# Patient Record
Sex: MALE | Race: OTHER | HISPANIC OR LATINO | Employment: UNEMPLOYED | URBAN - METROPOLITAN AREA
[De-identification: names, ages, dates, MRNs, and addresses within clinical notes are randomized per-mention and may not be internally consistent; named-entity substitution may affect disease eponyms.]

---

## 2023-02-16 ENCOUNTER — HOSPITAL ENCOUNTER (EMERGENCY)
Facility: HOSPITAL | Age: 48
Discharge: HOME | End: 2023-02-16
Attending: EMERGENCY MEDICINE

## 2023-02-16 ENCOUNTER — APPOINTMENT (EMERGENCY)
Dept: CT IMAGING | Facility: HOSPITAL | Age: 48
End: 2023-02-16

## 2023-02-16 ENCOUNTER — APPOINTMENT (EMERGENCY)
Dept: RADIOLOGY | Facility: HOSPITAL | Age: 48
End: 2023-02-16

## 2023-02-16 VITALS
RESPIRATION RATE: 16 BRPM | SYSTOLIC BLOOD PRESSURE: 138 MMHG | DIASTOLIC BLOOD PRESSURE: 88 MMHG | HEART RATE: 77 BPM | OXYGEN SATURATION: 96 % | TEMPERATURE: 99.9 F

## 2023-02-16 DIAGNOSIS — R07.81 RIB PAIN ON LEFT SIDE: ICD-10-CM

## 2023-02-16 DIAGNOSIS — S20.212A RIB CONTUSION, LEFT, INITIAL ENCOUNTER: ICD-10-CM

## 2023-02-16 DIAGNOSIS — M25.572 LEFT ANKLE PAIN: ICD-10-CM

## 2023-02-16 DIAGNOSIS — R10.9 LEFT FLANK PAIN: ICD-10-CM
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
## 2023-02-16 DIAGNOSIS — S82.832A CLOSED FRACTURE OF LEFT DISTAL FIBULA: Primary | ICD-10-CM

## 2023-02-16 LAB
ANION GAP SERPL CALCULATED.3IONS-SCNC: 8 MMOL/L (ref 4–13)
BASOPHILS # BLD AUTO: 0.06 THOUSANDS/ÂΜL (ref 0–0.1)
BASOPHILS NFR BLD AUTO: 0 % (ref 0–1)
BUN SERPL-MCNC: 17 MG/DL (ref 5–25)
CALCIUM SERPL-MCNC: 9.2 MG/DL (ref 8.4–10.2)
CHLORIDE SERPL-SCNC: 103 MMOL/L (ref 96–108)
CO2 SERPL-SCNC: 27 MMOL/L (ref 21–32)
CREAT SERPL-MCNC: 1.03 MG/DL (ref 0.6–1.3)
EOSINOPHIL # BLD AUTO: 0.34 THOUSAND/ÂΜL (ref 0–0.61)
EOSINOPHIL NFR BLD AUTO: 3 % (ref 0–6)
ERYTHROCYTE [DISTWIDTH] IN BLOOD BY AUTOMATED COUNT: 12.8 % (ref 11.6–15.1)
GFR SERPL CREATININE-BSD FRML MDRD: 85 ML/MIN/1.73SQ M
GLUCOSE SERPL-MCNC: 108 MG/DL (ref 65–140)
HCT VFR BLD AUTO: 44.8 % (ref 36.5–49.3)
HGB BLD-MCNC: 14.9 G/DL (ref 12–17)
IMM GRANULOCYTES # BLD AUTO: 0.05 THOUSAND/UL (ref 0–0.2)
IMM GRANULOCYTES NFR BLD AUTO: 0 % (ref 0–2)
LYMPHOCYTES # BLD AUTO: 4.06 THOUSANDS/ÂΜL (ref 0.6–4.47)
LYMPHOCYTES NFR BLD AUTO: 30 % (ref 14–44)
MCH RBC QN AUTO: 29 PG (ref 26.8–34.3)
MCHC RBC AUTO-ENTMCNC: 33.3 G/DL (ref 31.4–37.4)
MCV RBC AUTO: 87 FL (ref 82–98)
MONOCYTES # BLD AUTO: 0.91 THOUSAND/ÂΜL (ref 0.17–1.22)
MONOCYTES NFR BLD AUTO: 7 % (ref 4–12)
NEUTROPHILS # BLD AUTO: 8.07 THOUSANDS/ÂΜL (ref 1.85–7.62)
NEUTS SEG NFR BLD AUTO: 60 % (ref 43–75)
NRBC BLD AUTO-RTO: 0 /100 WBCS
PLATELET # BLD AUTO: 299 THOUSANDS/UL (ref 149–390)
PMV BLD AUTO: 8.4 FL (ref 8.9–12.7)
POTASSIUM SERPL-SCNC: 4.2 MMOL/L (ref 3.5–5.3)
RBC # BLD AUTO: 5.13 MILLION/UL (ref 3.88–5.62)
SODIUM SERPL-SCNC: 138 MMOL/L (ref 135–147)
WBC # BLD AUTO: 13.49 THOUSAND/UL (ref 4.31–10.16)

## 2023-02-16 RX ORDER — LIDOCAINE 50 MG/G
1 PATCH TOPICAL ONCE
Status: DISCONTINUED | OUTPATIENT
Start: 2023-02-16 | End: 2023-02-16 | Stop reason: HOSPADM

## 2023-02-16 RX ORDER — OXYCODONE HYDROCHLORIDE AND ACETAMINOPHEN 5; 325 MG/1; MG/1
1 TABLET ORAL EVERY 4 HOURS PRN
Qty: 20 TABLET | Refills: 0 | Status: SHIPPED | OUTPATIENT
Start: 2023-02-16

## 2023-02-16 RX ORDER — FENTANYL CITRATE 50 UG/ML
50 INJECTION, SOLUTION INTRAMUSCULAR; INTRAVENOUS ONCE
Status: COMPLETED | OUTPATIENT
Start: 2023-02-16 | End: 2023-02-16

## 2023-02-16 RX ORDER — METHOCARBAMOL 500 MG/1
500 TABLET, FILM COATED ORAL 2 TIMES DAILY
Qty: 20 TABLET | Refills: 0 | Status: SHIPPED | OUTPATIENT
Start: 2023-02-16

## 2023-02-16 RX ORDER — LIDOCAINE 50 MG/G
1 PATCH TOPICAL DAILY
Qty: 20 PATCH | Refills: 0 | Status: SHIPPED | OUTPATIENT
Start: 2023-02-16

## 2023-02-16 RX ADMIN — LIDOCAINE 5% 1 PATCH: 700 PATCH TOPICAL at 18:49

## 2023-02-16 RX ADMIN — IOHEXOL 100 ML: 350 INJECTION, SOLUTION INTRAVENOUS at 19:48

## 2023-02-16 RX ADMIN — FENTANYL CITRATE 50 MCG: 50 INJECTION, SOLUTION INTRAMUSCULAR; INTRAVENOUS at 18:47

## 2023-02-16 NOTE — ED PROVIDER NOTES
History  Chief Complaint   Patient presents with   • Fall     Pt reports he fell down 7 stairs onto butt, injured left ankle and left side of back/ribs  49 y o  M p/w left ankle pain and left back/rib pain s/p fall down stairs  Denies head injury or LOC  Having pain to left ankle but has been ambulatory as well as left flank/left posterior rib pain  History provided by:  Patient   used: No    Fall  Mechanism of injury: fall    Incident location:  Home  Fall:     Fall occurred:  Down stairs  Associated symptoms: back pain    Associated symptoms: no headaches        None       History reviewed  No pertinent past medical history  History reviewed  No pertinent surgical history  History reviewed  No pertinent family history  I have reviewed and agree with the history as documented  E-Cigarette/Vaping     E-Cigarette/Vaping Substances     Social History     Tobacco Use   • Smoking status: Every Day     Packs/day: 0 25     Types: Cigarettes   Substance Use Topics   • Alcohol use: Never   • Drug use: Never       Review of Systems   Musculoskeletal: Positive for back pain  Left ankle pain   Neurological: Negative for headaches  Physical Exam  Physical Exam  Vitals and nursing note reviewed  Constitutional:       General: He is not in acute distress  Appearance: Normal appearance  He is well-developed  He is not ill-appearing, toxic-appearing or diaphoretic  HENT:      Head: Normocephalic and atraumatic  No raccoon eyes, Win's sign, abrasion, contusion or laceration  Right Ear: Tympanic membrane and external ear normal  No laceration or drainage  No hemotympanum  Left Ear: Tympanic membrane and external ear normal  No laceration or drainage  No hemotympanum  Nose: Nose normal    Eyes:      General:         Right eye: No discharge  Left eye: No discharge  Extraocular Movements: Extraocular movements intact        Conjunctiva/sclera: Right eye: No hemorrhage  Left eye: No hemorrhage  Pupils: Pupils are equal, round, and reactive to light  Neck:      Vascular: No JVD  Trachea: Trachea normal  No tracheal tenderness or tracheal deviation  Cardiovascular:      Rate and Rhythm: Normal rate and regular rhythm  Pulses:           Dorsalis pedis pulses are 2+ on the left side  Heart sounds: Normal heart sounds  No murmur heard  No friction rub  Pulmonary:      Effort: Pulmonary effort is normal  No accessory muscle usage, respiratory distress or retractions  Breath sounds: Normal breath sounds  No stridor  No decreased breath sounds, wheezing, rhonchi or rales  Chest:      Chest wall: No tenderness  Abdominal:      General: There is no distension  Palpations: Abdomen is soft  Abdomen is not rigid  There is no mass  Tenderness: There is no abdominal tenderness  There is no guarding or rebound  Musculoskeletal:         General: No deformity  Normal range of motion  Cervical back: Full passive range of motion without pain and normal range of motion  No bony tenderness  No spinous process tenderness or muscular tenderness  Normal range of motion  Thoracic back: No bony tenderness  Lumbar back: No bony tenderness  Left ankle: Swelling and ecchymosis present  Tenderness present over the lateral malleolus and medial malleolus  Normal pulse  Left foot: Normal range of motion  No swelling, tenderness or crepitus  Normal pulse  Comments: TTP to left posterolateral ribs with bruising   Skin:     General: Skin is warm and dry  Coloration: Skin is not pale  Findings: Bruising (left back/flank, left ankle) present  No abrasion, ecchymosis or laceration  Neurological:      Mental Status: He is alert  GCS: GCS eye subscore is 4  GCS verbal subscore is 5  GCS motor subscore is 6  Cranial Nerves: No cranial nerve deficit  Sensory: No sensory deficit  Motor: Motor function is intact  Psychiatric:         Behavior: Behavior normal  Behavior is cooperative           Vital Signs  ED Triage Vitals   Temperature Pulse Respirations Blood Pressure SpO2   02/16/23 1822 02/16/23 1822 02/16/23 1822 02/16/23 1822 02/16/23 1822   99 9 °F (37 7 °C) 100 18 128/88 96 %      Temp Source Heart Rate Source Patient Position - Orthostatic VS BP Location FiO2 (%)   02/16/23 1822 -- 02/16/23 2120 02/16/23 2120 --   Oral  Sitting Left arm       Pain Score       02/16/23 1847       10 - Worst Possible Pain           Vitals:    02/16/23 1822 02/16/23 2120   BP: 128/88 138/88   Pulse: 100 77   Patient Position - Orthostatic VS:  Sitting         Visual Acuity      ED Medications  Medications   lidocaine (LIDODERM) 5 % patch 1 patch (1 patch Topical Medication Applied 2/16/23 1849)   fentanyl citrate (PF) 100 MCG/2ML 50 mcg (50 mcg Intravenous Given 2/16/23 1847)   iohexol (OMNIPAQUE) 350 MG/ML injection (SINGLE-DOSE) 100 mL (100 mL Intravenous Given 2/16/23 1948)       Diagnostic Studies  Results Reviewed     Procedure Component Value Units Date/Time    Basic metabolic panel [[id number]] Collected: 02/16/23 1846    Lab Status: Final result Specimen: Blood from Arm, Right Updated: 02/16/23 1930     Sodium 138 mmol/L      Potassium 4 2 mmol/L      Chloride 103 mmol/L      CO2 27 mmol/L      ANION GAP 8 mmol/L      BUN 17 mg/dL      Creatinine 1 03 mg/dL      Glucose 108 mg/dL      Calcium 9 2 mg/dL      eGFR 85 ml/min/1 73sq m     Narrative:      Edu guidelines for Chronic Kidney Disease (CKD):   •  Stage 1 with normal or high GFR (GFR > 90 mL/min/1 73 square meters)  •  Stage 2 Mild CKD (GFR = 60-89 mL/min/1 73 square meters)  •  Stage 3A Moderate CKD (GFR = 45-59 mL/min/1 73 square meters)  •  Stage 3B Moderate CKD (GFR = 30-44 mL/min/1 73 square meters)  •  Stage 4 Severe CKD (GFR = 15-29 mL/min/1 73 square meters)  •  Stage 5 End Stage CKD (GFR <15 mL/min/1 73 square meters)  Note: GFR calculation is accurate only with a steady state creatinine    CBC and differential [[id number]]  (Abnormal) Collected: 02/16/23 1846    Lab Status: Final result Specimen: Blood from Arm, Right Updated: 02/16/23 1856     WBC 13 49 Thousand/uL      RBC 5 13 Million/uL      Hemoglobin 14 9 g/dL      Hematocrit 44 8 %      MCV 87 fL      MCH 29 0 pg      MCHC 33 3 g/dL      RDW 12 8 %      MPV 8 4 fL      Platelets 984 Thousands/uL      nRBC 0 /100 WBCs      Neutrophils Relative 60 %      Immat GRANS % 0 %      Lymphocytes Relative 30 %      Monocytes Relative 7 %      Eosinophils Relative 3 %      Basophils Relative 0 %      Neutrophils Absolute 8 07 Thousands/µL      Immature Grans Absolute 0 05 Thousand/uL      Lymphocytes Absolute 4 06 Thousands/µL      Monocytes Absolute 0 91 Thousand/µL      Eosinophils Absolute 0 34 Thousand/µL      Basophils Absolute 0 06 Thousands/µL                  CT chest abdomen pelvis w contrast   Final Result by Harjit Matias MD (02/16 2058)      No evidence of acute trauma in the chest, abdomen or pelvis  Workstation performed: [id number]         XR ankle 3+ views LEFT   ED Interpretation by Kavon Ortiz DO (02/16 1925)   Abnormal   Interpreted by me as Distal fib fx                 Procedures  Procedures         ED Course  ED Course as of 02/16/23 2145   Thu Feb 16, 2023 1937 Updated pt on xray result of fib fx and plan for posterior static splint  2043 Posterior static leg splint applied by RN  Joaquín Root    2107 Updated pt and family on CT result  Instructed them to f/u with ortho  Ambulatory referral placed to ortho  SBIRT 20yo+    Flowsheet Row Most Recent Value   SBIRT (25 yo +)    In order to provide better care to our patients, we are screening all of our patients for alcohol and drug use  Would it be okay to ask you these screening questions?  Unable to answer at this time Filed at: 02/16/2023 1826                    [facility]    Disposition  Final diagnoses:   Closed fracture of left distal fibula   Left ankle pain   Fall   Rib pain on left side   Left flank pain   Rib contusion, left, initial encounter     Time reflects when diagnosis was documented in both MDM as applicable and the Disposition within this note     Time User Action Codes Description Comment    2/16/2023  7:26 PM Dylon, [address] Closed fracture of left distal fibula     2/16/2023  8:47 PM Dylon, [address] [M25 572] Left ankle pain     2/16/2023  8:47 PM Dylon, [facility]	Hanna Add [W19  XXXA] Fall     2/16/2023  8:47 PM Dylon, [employer] [address]	Hanna Add [R07 81] Rib pain on left side     2/16/2023  8:47 PM Dylon, Crystal L Add [R10 9] Left flank pain     2/16/2023  9:21 PM Dylon, [address] Rib contusion, left, initial encounter       ED Disposition     ED Disposition   Discharge    Condition   Stable    Date/Time   Thu Feb 16, 2023  9:01 PM    Comment   Rafy Meeks discharge to home/self care                 Follow-up Information     Follow up With Specialties Details Why Contact Info Additional [address] [facility] Orthopedic Surgery Schedule an appointment as soon as possible for a visit   [address] [facility] [address] [facility] [address]  [address] [place] [address], [address] [place] [address], [address] [place] [address], [id number] [phone]          Discharge Medication List as of 2/16/2023  9:01 PM      START taking these medications    Details   lidocaine (Lidoderm) 5 % Apply 1 patch topically over 12 hours daily Remove & Discard patch within 12 hours or as directed by MD, Starting Thu 2/16/2023, Normal      methocarbamol (ROBAXIN) 500 mg tablet Take 1 tablet (500 mg total) by mouth 2 (two) times a day, Starting Thu 2/16/2023, Normal      oxyCODONE-acetaminophen (Percocet) 5-325 mg per tablet Take 1 tablet by mouth every 4 (four) hours as needed for moderate pain Max Daily Amount: 6 tablets, Starting Thu 2/16/2023, Normal                 PDMP Review     None          ED Provider  Electronically Signed by           [address],   02/16/23 9693

## 2023-02-16 NOTE — Clinical Note
Aziza Hagen was seen and treated in our emergency department on 2/16/2023  No use of left leg until cleared by orthopedics    Diagnosis:     [employer]  may return to work on return date  He may return on this date: 02/20/2023         If you have any questions or concerns, please don't hesitate to call        [address] [facility] Sp DO    ______________________________           _______________          _______________  [facility] Representative                              Date                                Time